# Patient Record
Sex: MALE | Race: WHITE | URBAN - METROPOLITAN AREA
[De-identification: names, ages, dates, MRNs, and addresses within clinical notes are randomized per-mention and may not be internally consistent; named-entity substitution may affect disease eponyms.]

---

## 2017-01-05 ENCOUNTER — EMERGENCY (EMERGENCY)
Facility: HOSPITAL | Age: 27
LOS: 1 days | Discharge: HOME | End: 2017-01-05
Attending: EMERGENCY MEDICINE | Admitting: EMERGENCY MEDICINE
Payer: COMMERCIAL

## 2017-01-05 VITALS
RESPIRATION RATE: 20 BRPM | HEART RATE: 97 BPM | SYSTOLIC BLOOD PRESSURE: 117 MMHG | DIASTOLIC BLOOD PRESSURE: 75 MMHG | WEIGHT: 179.9 LBS | HEIGHT: 69 IN | OXYGEN SATURATION: 97 % | TEMPERATURE: 98 F

## 2017-01-05 VITALS
SYSTOLIC BLOOD PRESSURE: 112 MMHG | HEART RATE: 88 BPM | RESPIRATION RATE: 17 BRPM | DIASTOLIC BLOOD PRESSURE: 68 MMHG | OXYGEN SATURATION: 98 %

## 2017-01-05 PROCEDURE — 73562 X-RAY EXAM OF KNEE 3: CPT | Mod: 26,76,RT

## 2017-01-05 PROCEDURE — 71020: CPT | Mod: 26

## 2017-01-05 PROCEDURE — 73030 X-RAY EXAM OF SHOULDER: CPT | Mod: 26,LT

## 2017-01-05 PROCEDURE — 99284 EMERGENCY DEPT VISIT MOD MDM: CPT

## 2017-01-05 PROCEDURE — 73562 X-RAY EXAM OF KNEE 3: CPT

## 2017-01-05 PROCEDURE — 99284 EMERGENCY DEPT VISIT MOD MDM: CPT | Mod: 25

## 2017-01-05 PROCEDURE — 71046 X-RAY EXAM CHEST 2 VIEWS: CPT

## 2017-01-05 PROCEDURE — 73030 X-RAY EXAM OF SHOULDER: CPT

## 2017-01-05 RX ORDER — IBUPROFEN 200 MG
600 TABLET ORAL ONCE
Qty: 0 | Refills: 0 | Status: COMPLETED | OUTPATIENT
Start: 2017-01-05 | End: 2017-01-05

## 2017-01-05 RX ADMIN — Medication 600 MILLIGRAM(S): at 16:08
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
## 2017-01-05 NOTE — ED PROVIDER NOTE - PHYSICAL EXAMINATION
Skin: abrasions to both knees  MSK: normal ROM of both knees. Left shoulder tender at AC joint.   Chest wall: no anterior chest wall tenderness or crepitus. Some left lower paralumbar tenderness. Skin: abrasions to both knees  MSK: normal ROM of both knees. Left shoulder tender at AC joint.   Limited ability to abduct at the shoulder.   Chest wall: no anterior chest wall tenderness or crepitus. Some left lower paralumbar tenderness.

## 2017-01-05 NOTE — ED ADULT NURSE NOTE - OBJECTIVE STATEMENT
Patient received alert and oriented x3 in hallway. Noted with complaints of pain in left shoulder and bilateral knees. Noted with abrasion to bilateral knees and knuckles. Noted with fill range of motions and OOB with no assistance needed.
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
## 2017-02-01 ENCOUNTER — OUTPATIENT (OUTPATIENT)
Dept: OUTPATIENT SERVICES | Facility: HOSPITAL | Age: 27
LOS: 1 days | End: 2017-02-01
Payer: MEDICAID

## 2017-02-28 DIAGNOSIS — R69 ILLNESS, UNSPECIFIED: ICD-10-CM

## 2017-03-01 PROCEDURE — G9001: CPT

## 2018-03-25 ENCOUNTER — EMERGENCY (EMERGENCY)
Facility: HOSPITAL | Age: 28
LOS: 1 days | End: 2018-03-25
Payer: OTHER GOVERNMENT

## 2018-03-25 PROCEDURE — 99283 EMERGENCY DEPT VISIT LOW MDM: CPT
